# Patient Record
Sex: FEMALE | Race: WHITE | Employment: FULL TIME | URBAN - METROPOLITAN AREA
[De-identification: names, ages, dates, MRNs, and addresses within clinical notes are randomized per-mention and may not be internally consistent; named-entity substitution may affect disease eponyms.]

---

## 2019-04-19 ENCOUNTER — APPOINTMENT (OUTPATIENT)
Dept: MRI IMAGING | Age: 24
End: 2019-04-19
Payer: COMMERCIAL

## 2019-04-19 ENCOUNTER — HOSPITAL ENCOUNTER (EMERGENCY)
Age: 24
Discharge: HOME | End: 2019-04-19
Attending: EMERGENCY MEDICINE
Payer: COMMERCIAL

## 2019-04-19 ENCOUNTER — APPOINTMENT (OUTPATIENT)
Dept: ULTRASOUND IMAGING | Age: 24
End: 2019-04-19
Payer: COMMERCIAL

## 2019-04-19 VITALS
SYSTOLIC BLOOD PRESSURE: 95 MMHG | BODY MASS INDEX: 18 KG/M2 | HEIGHT: 59 IN | DIASTOLIC BLOOD PRESSURE: 52 MMHG | WEIGHT: 89.29 LBS | HEART RATE: 88 BPM | RESPIRATION RATE: 14 BRPM | TEMPERATURE: 97.7 F | OXYGEN SATURATION: 100 %

## 2019-04-19 DIAGNOSIS — N83.201 RIGHT OVARIAN CYST: ICD-10-CM

## 2019-04-19 DIAGNOSIS — Z34.91 FIRST TRIMESTER PREGNANCY: Primary | ICD-10-CM

## 2019-04-19 DIAGNOSIS — R10.2 PELVIC PAIN IN FEMALE: ICD-10-CM

## 2019-04-19 LAB
A/G RATIO: 1.5 (ref 1.1–2.2)
ABO/RH: NORMAL
ALBUMIN SERPL-MCNC: 4.3 G/DL (ref 3.4–5)
ALP BLD-CCNC: 51 U/L (ref 40–129)
ALT SERPL-CCNC: 11 U/L (ref 10–40)
ANION GAP SERPL CALCULATED.3IONS-SCNC: 14 MMOL/L (ref 3–16)
AST SERPL-CCNC: 13 U/L (ref 15–37)
BASOPHILS ABSOLUTE: 0 K/UL (ref 0–0.2)
BASOPHILS RELATIVE PERCENT: 0.4 %
BILIRUB SERPL-MCNC: 0.4 MG/DL (ref 0–1)
BILIRUBIN URINE: NEGATIVE
BLOOD, URINE: NEGATIVE
BUN BLDV-MCNC: 9 MG/DL (ref 7–20)
CALCIUM SERPL-MCNC: 9 MG/DL (ref 8.3–10.6)
CHLORIDE BLD-SCNC: 103 MMOL/L (ref 99–110)
CLARITY: CLEAR
CO2: 20 MMOL/L (ref 21–32)
COLOR: YELLOW
CREAT SERPL-MCNC: <0.5 MG/DL (ref 0.6–1.1)
EOSINOPHILS ABSOLUTE: 0.4 K/UL (ref 0–0.6)
EOSINOPHILS RELATIVE PERCENT: 4.6 %
GFR AFRICAN AMERICAN: >60
GFR NON-AFRICAN AMERICAN: >60
GLOBULIN: 2.8 G/DL
GLUCOSE BLD-MCNC: 80 MG/DL (ref 70–99)
GLUCOSE URINE: NEGATIVE MG/DL
GONADOTROPIN, CHORIONIC (HCG) QUANT: 136.6 MIU/ML
HCT VFR BLD CALC: 38.4 % (ref 36–48)
HEMOGLOBIN: 12.7 G/DL (ref 12–16)
KETONES, URINE: 40 MG/DL
LEUKOCYTE ESTERASE, URINE: NEGATIVE
LYMPHOCYTES ABSOLUTE: 1.3 K/UL (ref 1–5.1)
LYMPHOCYTES RELATIVE PERCENT: 16.9 %
MCH RBC QN AUTO: 28.9 PG (ref 26–34)
MCHC RBC AUTO-ENTMCNC: 33.2 G/DL (ref 31–36)
MCV RBC AUTO: 86.8 FL (ref 80–100)
MICROSCOPIC EXAMINATION: ABNORMAL
MONOCYTES ABSOLUTE: 0.5 K/UL (ref 0–1.3)
MONOCYTES RELATIVE PERCENT: 5.9 %
NEUTROPHILS ABSOLUTE: 5.7 K/UL (ref 1.7–7.7)
NEUTROPHILS RELATIVE PERCENT: 72.2 %
NITRITE, URINE: NEGATIVE
PDW BLD-RTO: 13.2 % (ref 12.4–15.4)
PH UA: 6.5 (ref 5–8)
PLATELET # BLD: 190 K/UL (ref 135–450)
PMV BLD AUTO: 8.9 FL (ref 5–10.5)
POTASSIUM REFLEX MAGNESIUM: 3.8 MMOL/L (ref 3.5–5.1)
PROTEIN UA: NEGATIVE MG/DL
RBC # BLD: 4.42 M/UL (ref 4–5.2)
SODIUM BLD-SCNC: 137 MMOL/L (ref 136–145)
SPECIFIC GRAVITY UA: 1.02 (ref 1–1.03)
TOTAL PROTEIN: 7.1 G/DL (ref 6.4–8.2)
URINE REFLEX TO CULTURE: ABNORMAL
URINE TYPE: ABNORMAL
UROBILINOGEN, URINE: 1 E.U./DL
WBC # BLD: 8 K/UL (ref 4–11)

## 2019-04-19 PROCEDURE — 6370000000 HC RX 637 (ALT 250 FOR IP): Performed by: PHYSICIAN ASSISTANT

## 2019-04-19 PROCEDURE — 81003 URINALYSIS AUTO W/O SCOPE: CPT

## 2019-04-19 PROCEDURE — 84702 CHORIONIC GONADOTROPIN TEST: CPT

## 2019-04-19 PROCEDURE — 74181 MRI ABDOMEN W/O CONTRAST: CPT

## 2019-04-19 PROCEDURE — 86900 BLOOD TYPING SEROLOGIC ABO: CPT

## 2019-04-19 PROCEDURE — 85025 COMPLETE CBC W/AUTO DIFF WBC: CPT

## 2019-04-19 PROCEDURE — 80053 COMPREHEN METABOLIC PANEL: CPT

## 2019-04-19 PROCEDURE — 76817 TRANSVAGINAL US OBSTETRIC: CPT

## 2019-04-19 PROCEDURE — 76801 OB US < 14 WKS SINGLE FETUS: CPT

## 2019-04-19 PROCEDURE — 86901 BLOOD TYPING SEROLOGIC RH(D): CPT

## 2019-04-19 PROCEDURE — 99284 EMERGENCY DEPT VISIT MOD MDM: CPT

## 2019-04-19 RX ORDER — ACETAMINOPHEN 325 MG/1
650 TABLET ORAL ONCE
Status: COMPLETED | OUTPATIENT
Start: 2019-04-19 | End: 2019-04-19

## 2019-04-19 RX ADMIN — ACETAMINOPHEN 650 MG: 325 TABLET, FILM COATED ORAL at 11:17

## 2019-04-19 ASSESSMENT — PAIN DESCRIPTION - PAIN TYPE
TYPE: ACUTE PAIN

## 2019-04-19 ASSESSMENT — PAIN DESCRIPTION - FREQUENCY
FREQUENCY: CONTINUOUS

## 2019-04-19 ASSESSMENT — PAIN - FUNCTIONAL ASSESSMENT
PAIN_FUNCTIONAL_ASSESSMENT: PREVENTS OR INTERFERES SOME ACTIVE ACTIVITIES AND ADLS
PAIN_FUNCTIONAL_ASSESSMENT: PREVENTS OR INTERFERES SOME ACTIVE ACTIVITIES AND ADLS
PAIN_FUNCTIONAL_ASSESSMENT: 0-10

## 2019-04-19 ASSESSMENT — PAIN DESCRIPTION - DESCRIPTORS
DESCRIPTORS: ACHING;CRAMPING
DESCRIPTORS: SHARP;STABBING;THROBBING
DESCRIPTORS: SHARP;STABBING;THROBBING

## 2019-04-19 ASSESSMENT — PAIN DESCRIPTION - ORIENTATION
ORIENTATION: RIGHT;LOWER

## 2019-04-19 ASSESSMENT — PAIN SCALES - GENERAL
PAINLEVEL_OUTOF10: 6
PAINLEVEL_OUTOF10: 7

## 2019-04-19 ASSESSMENT — PAIN DESCRIPTION - ONSET
ONSET: ON-GOING
ONSET: AWAKENED FROM SLEEP
ONSET: ON-GOING

## 2019-04-19 ASSESSMENT — PAIN DESCRIPTION - LOCATION
LOCATION: ABDOMEN

## 2019-04-19 ASSESSMENT — PAIN DESCRIPTION - PROGRESSION
CLINICAL_PROGRESSION: NOT CHANGED

## 2019-04-19 NOTE — ED PROVIDER NOTES
[address]  eMERGENCY dEPARTMENT eNCOUnter      Pt Name: Alyse Chambers  MRN: [id number]  [place] 1995  Date of evaluation: 4/19/2019  Provider: Estrella Ku Dr       Chief Complaint   Patient presents with    Abdominal Cramping     Possible ectopic pregnancy          HISTORY OF PRESENT ILLNESS  (Location/Symptom, Timing/Onset, Context/Setting, Quality, Duration, Modifying Factors, Severity.)   Alyse Chambers is a 26 y.o. female who presents to the emergency department with a complaint of right-sided lower abdominal and pelvic pain. Patient says that very recently she was scheduled to have some cosmetic surgery, but this had to be postponed because she had a positive urine pregnancy test.  She says her last normal menstrual period was in February, and she only had a little bit of spotting in the month of March. She says she followed up that pregnancy test with a visit to clinic, where she received a transvaginal ultrasound that didn't really show anything. She reports that she came into the emergency room today because she has had sharp pain in her right lower abdomen and pelvic area that began this morning and has been constant since then, stabbing in nature, nonradiating. She denies any urinary complaints. Denies vaginal bleeding, itching or abnormal discharge. She denies nausea or vomiting. Denies any recent trauma. Reports past history of one pregnancy with vaginal delivery four  years ago. Denies any known medical problems. No other complaints. Nursing Notes were reviewed and I agree. REVIEW OF SYSTEMS    (2-9 systems for level 4, 10 or more for level 5)     Constitutional:  Negative for fever, chills, appetite change, fatigue and unexpected weight change. HENT:  Negative for congestion, ear pain, facial swelling, rhinorrhea, sinus pressure, sneezing, sore throat and trouble swallowing.     Eyes:  Negative for photophobia, pain and visual disturbance. Respiratory:  Negative for cough, shortness of breath, wheezing and stridor. Cardiovascular:  Negative for chest pain, palpitations and leg swelling. Gastrointestinal:  Positive for right lower abdominal pain. Negative for nausea, vomiting, diarrhea, constipation and blood in stool. Genitourinary:  Positive for right sided pelvic pain. Negative for dysuria, urgency, hematuria, flank pain, vaginal bleeding, vaginal discharge. Musculoskeletal:  Negative for myalgias, arthralgias, neck pain and neck stiffness. Neurological:  Negative for dizziness, seizures, syncope, speech difficulty, weakness, light-headedness, numbness and headaches. Psychiatric/Behavioral:  Negative for suicidal ideas, hallucinations, confusion, sleep disturbance and agitation. Except as noted above the remainder of the review of systems was reviewed and negative. PAST MEDICAL HISTORY         Diagnosis Date    Kidney disease        SURGICAL HISTORY           Procedure Laterality Date    URETER STENT PLACEMENT      left kidney 2010       CURRENT MEDICATIONS       Previous Medications    PRENATAL VIT-FE FUMARATE-FA (PRENATAL VITAMIN) 27-0.8 MG TABS    Take 1 tablet by mouth daily. ALLERGIES     Patient has no known allergies. FAMILY HISTORY     History reviewed. No pertinent family history. No family status information on file. SOCIAL HISTORY      reports that she has never smoked. She has never used smokeless tobacco. She reports that she drinks alcohol. She reports that she does not use drugs. PHYSICAL EXAM    (up to 7 for level 4, 8 or more for level 5)     ED Triage Vitals [04/19/19 1018]   BP Temp Temp Source Pulse Resp SpO2 Height Weight   111/76 97.7 °F (36.5 °C) Oral 100 17 100 % 4' 11\" (1.499 m) 88 lb 6.5 oz (40.1 kg)       Constitutional:  Appearing well-developed and well-nourished. No distress. HENT:  Normocephalic and atraumatic.  Conjunctivae and EOM are normal. Pupils are equal, round, and reactive to light. Neck:  Normal range of motion. Neck supple. No tracheal deviation present. No thyromegaly present. No cervical adenopathy. Cardiovascular:  Normal rate, regular rhythm, normal heart sounds and intact distal pulses. Pulmonary/Chest:  Effort normal and breath sounds normal. No respiratory distress. No wheezes or rales. Abdominal:  Soft. Bowel sounds are normal.  Moderate tenderness palpation over the right lower abdomen and pelvic area. No distension, mass, rebound or guarding. Musculoskeletal:  Normal range of motion. No edema exhibited. Neurological:  Alert and oriented to person, place, and time. No cranial nerve deficit. Skin:  Skin is warm and dry. Not diaphoretic. Psychiatric:  Normal mood, affect, behavior, judgment and thought content. DIAGNOSTIC RESULTS     RADIOLOGY:     Interpretation per the Radiologist below, if available at the time of this note:    MRI ABDOMEN WO CONTRAST   Final Result   1. No MR evidence of acute appendicitis. 2. No intrauterine pregnancy is evident on MR or on prior ultrasound. A   follicle versus corpus luteum cyst right adnexa seems most likely, however I   cannot exclude ectopic pregnancy. Correlate with serial serum beta HCG   levels. Follow-up pelvis ultrasound may be indicated within the next several   days. US OB TRANSVAGINAL   Final Result   Un-localized gestational event. The differential includes early IUP,    in progress and ectopic pregnancy. Close clinical follow-up with   serial beta HCG and/or repeat ultrasound recommended as indicated. US OB LESS THAN 14 WEEKS SINGLE OR FIRST GESTATION   Final Result   Un-localized gestational event. The differential includes early IUP,    in progress and ectopic pregnancy. Close clinical follow-up with   serial beta HCG and/or repeat ultrasound recommended as indicated.              LABS:  Labs Reviewed   COMPREHENSIVE METABOLIC PANEL W/ REFLEX TO MG FOR LOW K - Abnormal; Notable for the following components:       Result Value    CO2 20 (*)     CREATININE <0.5 (*)     AST 13 (*)     All other components within normal limits    Narrative:     Performed at:  [facility]  [address] [facility] [address] [address]   Phone [phone]   URINE RT REFLEX TO CULTURE - Abnormal; Notable for the following components:    Ketones, Urine 40 (*)     All other components within normal limits    Narrative:     Performed at:  [facility]  [address] [facility] [address] [address]   Phone [phone]   CBC WITH AUTO DIFFERENTIAL    Narrative:     Performed at:  [facility]  [address] [place] [address]   Phone [phone]   HCG, QUANTITATIVE, PREGNANCY    Narrative:     Performed at:  [place] [address] [facility] [address] [address]   Phone [phone]   ABO/RH    Narrative:     Performed at:  [facility]  [address] [place] [address] [address]   Phone [phone]       All other labs were within normal range or not returned as of this dictation. EMERGENCY DEPARTMENT COURSE and DIFFERENTIAL DIAGNOSIS/MDM:   Vitals:    Vitals:    04/19/19 1027 04/19/19 1030 04/19/19 1045 04/19/19 1434   BP:  107/76 116/77 (!) 92/51   Pulse:    93   Resp:    14   Temp:       TempSrc:       SpO2:    100%   Weight: 89 lb 4.6 oz (40.5 kg)      Height: 4' 11\" (1.499 m)          The patient's condition in the ED was good, the patient was afebrile and nontoxic in appearance, and the patient's physical exam was unremarkable other than for pelvic tenderness, as noted above. Quantitative serum hCG was 136, no notable abnormalities otherwise on lab results.   Transvaginal ultrasound and along with MRI without contrast were negative for appendicitis, showed a gestational event, likely an early IUP, but  in progress and ectopic pregnancy cannot be ruled out. MRI also showed a likely right ovarian cyst, possibly the cause of the patient's pain. There was no indication for hospitalization or further workup. To discharge with instructions to follow-up with her OB provider for repeat testing in 48 hours, and she'll also be given referrals for OB clinic to be used if needed. I emphasized the importance of prompt repeat testing to the patient. The patient verbalized understanding and agreement with this plan of care. The patient was advised to return to the emergency department if symptoms should significantly worsen or if new and concerning symptoms should appear. ED attending physician Dr. Anthony Fleming evaluated the patient personally and agrees with this plan of care. I estimate there is LOW risk for ACUTE APPENDICITIS, BOWEL OBSTRUCTION, CHOLECYSTITIS, DIVERTICULITIS, INCARCERATED HERNIA, PANCREATITIS, PELVIC INFLAMMATORY DISEASE, OVARIAN TORSION, PERFORATED BOWEL,  BOWEL ISCHEMIA, CARDIAC ISCHEMIA, ECTOPIC PREGNANCY, or TUBO-OVARIAN ABSCESS, thus I consider the discharge disposition reasonable. Also, there is no evidence or peritonitis, sepsis, or toxicity. PROCEDURES:  None    FINAL IMPRESSION      1. First trimester pregnancy    2. Right ovarian cyst    3. Pelvic pain in female          DISPOSITION/PLAN   DISPOSITION        PATIENT REFERRED TO:  Your OB provider    Go to   for repeat blood testing in 48 hours    [address] [place] [address]  [phone]  Schedule an appointment as soon as possible for a visit   if needed      DISCHARGE MEDICATIONS:  New Prescriptions    No medications on file       (Please note that portions of this note were completed with a voice recognition program.  Efforts were made to edit the dictations but occasionally words are mis-transcribed.)    Efra Mclaughlin, [address] [place], [place]  [phone]

## 2019-04-20 NOTE — ED PROVIDER NOTES
Attending Supervising Physicians Attestation Statement  I was present with the Mid Level Provider during the history and exam. I discussed the findings and plans with the Mid Level Provider and agree as documented in her note     I discussed with patient the results of evaluation in the ED, diagnosis, care, and prognosis. The plan is to discharge to home. Patient is in agreement with plan and questions have been answered.      I also discussed with patient the reasons which may require a return visit and the importance of follow-up care. The patient is well-appearing, nontoxic, and improved at the time of discharge. Patient agrees to call to arrange follow-up care as directed. Patient understands to return immediately for worsening/change in symptoms.       Vitals:    04/19/19 1030 04/19/19 1045 04/19/19 1434 04/19/19 1627   BP: 107/76 116/77 (!) 92/51 (!) 95/52   Pulse:   93 88   Resp:   14 14   Temp:       TempSrc:       SpO2:   100% 100%   Weight:       Height:           Electronically signed by Elbert Bryant MD on 4/20/19 at 2:59 PM            Elbert Bryant MD  04/20/19 1640